# Patient Record
Sex: MALE | Race: OTHER | URBAN - METROPOLITAN AREA
[De-identification: names, ages, dates, MRNs, and addresses within clinical notes are randomized per-mention and may not be internally consistent; named-entity substitution may affect disease eponyms.]

---

## 2023-09-01 ENCOUNTER — APPOINTMENT (OUTPATIENT)
Dept: PRIMARY CARE | Facility: CLINIC | Age: 33
End: 2023-09-01
Payer: COMMERCIAL

## 2023-09-01 ENCOUNTER — CLINICAL SUPPORT (OUTPATIENT)
Dept: PRIMARY CARE | Facility: CLINIC | Age: 33
End: 2023-09-01
Payer: COMMERCIAL

## 2023-09-01 VITALS
TEMPERATURE: 97.8 F | DIASTOLIC BLOOD PRESSURE: 70 MMHG | SYSTOLIC BLOOD PRESSURE: 104 MMHG | BODY MASS INDEX: 33.33 KG/M2 | WEIGHT: 216 LBS

## 2023-09-01 DIAGNOSIS — Z71.85 IMMUNIZATION COUNSELING: Primary | ICD-10-CM

## 2023-09-01 PROCEDURE — 86706 HEP B SURFACE ANTIBODY: CPT

## 2023-09-01 ASSESSMENT — ENCOUNTER SYMPTOMS: DEPRESSION: 0

## 2023-09-01 ASSESSMENT — PATIENT HEALTH QUESTIONNAIRE - PHQ9
2. FEELING DOWN, DEPRESSED OR HOPELESS: NOT AT ALL
1. LITTLE INTEREST OR PLEASURE IN DOING THINGS: NOT AT ALL
SUM OF ALL RESPONSES TO PHQ9 QUESTIONS 1 AND 2: 0

## 2023-09-01 NOTE — PROGRESS NOTES
Subjective   Patient ID: [id number]     Bradley Mendoza is a 32 y.o. male who presents for labs  HPI  His school requires proof of hepatitis B vaccination status.  He has had the Hep B vaccine schedule.  Here for lab titers only.    Objective     There were no vitals taken for this visit.     Physical Exam    Assessment/Plan   Problem List Items Addressed This Visit    None  Visit Diagnoses       Immunization counseling    -  Primary    Relevant Orders    Hepatitis B surface antibody            Moe Araujo DO

## 2023-09-01 NOTE — PROGRESS NOTES
Patient is here for a blood draw to check Hep B titers.  Blood was drawn from the right arm.  Patient tolerated blood draw well.

## 2023-09-02 LAB — HEPATITIS B VIRUS SURFACE AB (MIU/ML) IN SERUM: 210 MIU/ML

## 2023-09-05 ENCOUNTER — TELEPHONE (OUTPATIENT)
Dept: PRIMARY CARE | Facility: CLINIC | Age: 33
End: 2023-09-05
Payer: COMMERCIAL

## 2023-10-09 PROBLEM — K75.81 STEATOHEPATITIS, NONALCOHOLIC: Status: ACTIVE | Noted: 2023-10-09

## 2023-10-09 PROBLEM — M25.562 LEFT KNEE PAIN: Status: ACTIVE | Noted: 2023-10-09

## 2023-10-09 PROBLEM — R05.9 COUGH: Status: ACTIVE | Noted: 2023-10-09

## 2023-10-09 PROBLEM — F41.9 ANXIETY: Status: ACTIVE | Noted: 2023-10-09

## 2023-10-09 PROBLEM — R79.89 ELEVATED LFTS: Status: ACTIVE | Noted: 2023-10-09

## 2023-10-09 PROBLEM — E78.5 HYPERLIPIDEMIA: Status: ACTIVE | Noted: 2023-10-09

## 2023-10-09 PROBLEM — M20.029 BOUTONNIERE DEFORMITY OF FINGER: Status: ACTIVE | Noted: 2023-10-09

## 2023-10-09 PROBLEM — R74.8 ELEVATED LIVER ENZYMES: Status: ACTIVE | Noted: 2023-10-09

## 2023-10-09 PROBLEM — R14.2 BELCHING: Status: ACTIVE | Noted: 2023-10-09

## 2023-10-09 PROBLEM — K76.0 FATTY LIVER: Status: ACTIVE | Noted: 2023-10-09

## 2023-10-09 PROBLEM — M25.50 ARTHRALGIA: Status: ACTIVE | Noted: 2023-10-09

## 2023-10-23 ENCOUNTER — OFFICE VISIT (OUTPATIENT)
Dept: PRIMARY CARE | Facility: CLINIC | Age: 33
End: 2023-10-23
Payer: COMMERCIAL

## 2023-10-23 VITALS
WEIGHT: 214 LBS | TEMPERATURE: 97.8 F | HEIGHT: 68 IN | DIASTOLIC BLOOD PRESSURE: 80 MMHG | BODY MASS INDEX: 32.43 KG/M2 | SYSTOLIC BLOOD PRESSURE: 118 MMHG

## 2023-10-23 DIAGNOSIS — Z00.00 GENERAL MEDICAL EXAM: Primary | ICD-10-CM

## 2023-10-23 LAB
ALBUMIN SERPL BCP-MCNC: 4.8 G/DL (ref 3.4–5)
ALP SERPL-CCNC: 37 U/L (ref 33–120)
ALT SERPL W P-5'-P-CCNC: 57 U/L (ref 10–52)
ANION GAP SERPL CALC-SCNC: 14 MMOL/L (ref 10–20)
APPEARANCE UR: ABNORMAL
AST SERPL W P-5'-P-CCNC: 23 U/L (ref 9–39)
BASOPHILS # BLD AUTO: 0.05 X10*3/UL (ref 0–0.1)
BASOPHILS NFR BLD AUTO: 0.7 %
BILIRUB SERPL-MCNC: 0.7 MG/DL (ref 0–1.2)
BILIRUB UR STRIP.AUTO-MCNC: NEGATIVE MG/DL
BUN SERPL-MCNC: 13 MG/DL (ref 6–23)
CALCIUM SERPL-MCNC: 9.4 MG/DL (ref 8.6–10.3)
CHLORIDE SERPL-SCNC: 103 MMOL/L (ref 98–107)
CHOLEST SERPL-MCNC: 183 MG/DL (ref 0–199)
CHOLESTEROL/HDL RATIO: 4.9
CO2 SERPL-SCNC: 24 MMOL/L (ref 21–32)
COLOR UR: ABNORMAL
CREAT SERPL-MCNC: 0.93 MG/DL (ref 0.5–1.3)
EOSINOPHIL # BLD AUTO: 0.25 X10*3/UL (ref 0–0.7)
EOSINOPHIL NFR BLD AUTO: 3.3 %
ERYTHROCYTE [DISTWIDTH] IN BLOOD BY AUTOMATED COUNT: 12.8 % (ref 11.5–14.5)
GFR SERPL CREATININE-BSD FRML MDRD: >90 ML/MIN/1.73M*2
GLUCOSE SERPL-MCNC: 72 MG/DL (ref 74–99)
GLUCOSE UR STRIP.AUTO-MCNC: NEGATIVE MG/DL
HCT VFR BLD AUTO: 45.4 % (ref 41–52)
HDLC SERPL-MCNC: 37.1 MG/DL
HGB BLD-MCNC: 15.4 G/DL (ref 13.5–17.5)
IMM GRANULOCYTES # BLD AUTO: 0.02 X10*3/UL (ref 0–0.7)
IMM GRANULOCYTES NFR BLD AUTO: 0.3 % (ref 0–0.9)
KETONES UR STRIP.AUTO-MCNC: NEGATIVE MG/DL
LDLC SERPL CALC-MCNC: 117 MG/DL
LEUKOCYTE ESTERASE UR QL STRIP.AUTO: NEGATIVE
LYMPHOCYTES # BLD AUTO: 1.27 X10*3/UL (ref 1.2–4.8)
LYMPHOCYTES NFR BLD AUTO: 16.6 %
MCH RBC QN AUTO: 28.7 PG (ref 26–34)
MCHC RBC AUTO-ENTMCNC: 33.9 G/DL (ref 32–36)
MCV RBC AUTO: 85 FL (ref 80–100)
MONOCYTES # BLD AUTO: 0.56 X10*3/UL (ref 0.1–1)
MONOCYTES NFR BLD AUTO: 7.3 %
NEUTROPHILS # BLD AUTO: 5.51 X10*3/UL (ref 1.2–7.7)
NEUTROPHILS NFR BLD AUTO: 71.8 %
NITRITE UR QL STRIP.AUTO: NEGATIVE
NON HDL CHOLESTEROL: 146 MG/DL (ref 0–149)
NRBC BLD-RTO: 0 /100 WBCS (ref 0–0)
PH UR STRIP.AUTO: 5 [PH]
PLATELET # BLD AUTO: 337 X10*3/UL (ref 150–450)
PMV BLD AUTO: 9.4 FL (ref 7.5–11.5)
POTASSIUM SERPL-SCNC: 4.2 MMOL/L (ref 3.5–5.3)
PROT SERPL-MCNC: 7.5 G/DL (ref 6.4–8.2)
PROT UR STRIP.AUTO-MCNC: NEGATIVE MG/DL
RBC # BLD AUTO: 5.36 X10*6/UL (ref 4.5–5.9)
RBC # UR STRIP.AUTO: NEGATIVE /UL
SODIUM SERPL-SCNC: 137 MMOL/L (ref 136–145)
SP GR UR STRIP.AUTO: 1.02
TRIGL SERPL-MCNC: 146 MG/DL (ref 0–149)
TSH SERPL-ACNC: 1.54 MIU/L (ref 0.44–3.98)
UROBILINOGEN UR STRIP.AUTO-MCNC: <2 MG/DL
VLDL: 29 MG/DL (ref 0–40)
WBC # BLD AUTO: 7.7 X10*3/UL (ref 4.4–11.3)

## 2023-10-23 PROCEDURE — 84443 ASSAY THYROID STIM HORMONE: CPT

## 2023-10-23 PROCEDURE — 83036 HEMOGLOBIN GLYCOSYLATED A1C: CPT

## 2023-10-23 PROCEDURE — 81003 URINALYSIS AUTO W/O SCOPE: CPT

## 2023-10-23 PROCEDURE — 99395 PREV VISIT EST AGE 18-39: CPT | Performed by: FAMILY MEDICINE

## 2023-10-23 PROCEDURE — 85025 COMPLETE CBC W/AUTO DIFF WBC: CPT

## 2023-10-23 PROCEDURE — 1036F TOBACCO NON-USER: CPT | Performed by: FAMILY MEDICINE

## 2023-10-23 PROCEDURE — 80053 COMPREHEN METABOLIC PANEL: CPT

## 2023-10-23 PROCEDURE — 80061 LIPID PANEL: CPT

## 2023-10-23 PROCEDURE — 36415 COLL VENOUS BLD VENIPUNCTURE: CPT

## 2023-10-23 ASSESSMENT — PROMIS GLOBAL HEALTH SCALE
RATE_PHYSICAL_HEALTH: GOOD
RATE_AVERAGE_PAIN: 0
RATE_MENTAL_HEALTH: GOOD
RATE_AVERAGE_FATIGUE: MILD
EMOTIONAL_PROBLEMS: OFTEN
RATE_QUALITY_OF_LIFE: GOOD
CARRYOUT_PHYSICAL_ACTIVITIES: COMPLETELY
RATE_SOCIAL_SATISFACTION: GOOD
CARRYOUT_SOCIAL_ACTIVITIES: GOOD
RATE_GENERAL_HEALTH: GOOD

## 2023-10-23 ASSESSMENT — ENCOUNTER SYMPTOMS
PALPITATIONS: 0
WEAKNESS: 0
NUMBNESS: 0
SHORTNESS OF BREATH: 0
COUGH: 0
SLEEP DISTURBANCE: 0
DIARRHEA: 0
SORE THROAT: 1
VOICE CHANGE: 0
ROS SKIN COMMENTS: NO MOLES GROWING OR CHANGING.
CONSTIPATION: 0
NAUSEA: 0
VOMITING: 0
WOUND: 0
MYALGIAS: 0
BACK PAIN: 0
WHEEZING: 0
BLOOD IN STOOL: 1
DYSPHORIC MOOD: 0
DYSURIA: 0
DEPRESSION: 0
RHINORRHEA: 1
HEADACHES: 0
FATIGUE: 0
SINUS PAIN: 0
NERVOUS/ANXIOUS: 1
TROUBLE SWALLOWING: 0
FEVER: 0
ARTHRALGIAS: 0
ADENOPATHY: 0
DIZZINESS: 1
ABDOMINAL PAIN: 0
HEMATURIA: 0

## 2023-10-23 ASSESSMENT — PATIENT HEALTH QUESTIONNAIRE - PHQ9
1. LITTLE INTEREST OR PLEASURE IN DOING THINGS: NOT AT ALL
2. FEELING DOWN, DEPRESSED OR HOPELESS: NOT AT ALL
SUM OF ALL RESPONSES TO PHQ9 QUESTIONS 1 AND 2: 0

## 2023-10-23 NOTE — PATIENT INSTRUCTIONS
I will order labs.  Try to restrict sugars and starches.  Try to increase exercise.  Return in one year for a general medical exam.  Return if the rectal bleeding comes back.  You had an episode a few months ago that has not returned.

## 2023-10-23 NOTE — PROGRESS NOTES
"Subjective   Patient ID: [id number]     Bradley Mendoza is a 32 y.o. male who presents for Annual Exam (Fasting).  HPI  He is here for a general medical exam.      He feels well in general.  /80.      No changes in medical hx.  No new surgeries or hospital stays.    No medications.    Never a smoker.  Drinks occasionally.  Occasional marijuana.  No other drugs.    FamHx.  Dad and both paternal grandparents DM.  Mom has bipolar and depression.  Sibs healthy.    Review of Systems   Constitutional:  Negative for fatigue and fever.   HENT:  Positive for rhinorrhea and sore throat. Negative for congestion, sinus pain, trouble swallowing and voice change.    Respiratory:  Negative for cough, shortness of breath and wheezing.    Cardiovascular:  Negative for chest pain, palpitations and leg swelling.   Gastrointestinal:  Positive for blood in stool (had blood in stool two months ago.  Thinks it was a hemorrhoid.  None since.). Negative for abdominal pain, constipation, diarrhea, nausea and vomiting.   Genitourinary:  Negative for dysuria, hematuria, scrotal swelling and testicular pain.   Musculoskeletal:  Negative for arthralgias, back pain and myalgias.   Skin:  Negative for rash and wound.        No moles growing or changing.   Neurological:  Positive for dizziness (occasional dizziness he relates to worsened astigmatism.). Negative for syncope, weakness, numbness and headaches.   Hematological:  Negative for adenopathy.   Psychiatric/Behavioral:  Negative for dysphoric mood, self-injury, sleep disturbance and suicidal ideas. The patient is nervous/anxious.         Had depression symptoms last year.  Better now.  More anxiety now.  No depression.           Objective     /80 (BP Location: Left arm, Patient Position: Sitting)   Temp 36.6 °C (97.8 °F) (Skin)   Ht 1.727 m (5' 8\")   Wt 97.1 kg (214 lb)   BMI 32.54 kg/m²      Physical Exam  Vitals reviewed.   Constitutional:       General: He is not in acute " distress.     Appearance: Normal appearance. He is not ill-appearing or toxic-appearing.   HENT:      Head: Normocephalic and atraumatic.      Right Ear: Tympanic membrane, ear canal and external ear normal.      Left Ear: Tympanic membrane, ear canal and external ear normal.      Nose: Nose normal.      Mouth/Throat:      Mouth: Mucous membranes are moist.   Eyes:      Extraocular Movements: Extraocular movements intact.      Conjunctiva/sclera: Conjunctivae normal.      Pupils: Pupils are equal, round, and reactive to light.   Cardiovascular:      Rate and Rhythm: Normal rate and regular rhythm.      Heart sounds: No murmur heard.  Pulmonary:      Effort: Pulmonary effort is normal.      Breath sounds: Normal breath sounds.   Abdominal:      General: Bowel sounds are normal. There is no distension.      Palpations: Abdomen is soft. There is no mass.      Tenderness: There is no abdominal tenderness. There is no guarding or rebound.   Genitourinary:     Penis: Normal.       Testes: Normal.   Musculoskeletal:         General: No tenderness.      Cervical back: Neck supple.      Right lower leg: No edema.      Left lower leg: No edema.   Skin:     Coloration: Skin is not jaundiced or pale.      Findings: No rash.   Neurological:      General: No focal deficit present.      Mental Status: He is alert and oriented to person, place, and time. Mental status is at baseline.   Psychiatric:         Mood and Affect: Mood normal.         Behavior: Behavior normal.         Thought Content: Thought content normal.         Judgment: Judgment normal.         Assessment/Plan   Problem List Items Addressed This Visit    None  Visit Diagnoses       General medical exam    -  Primary    Relevant Orders    CBC and Auto Differential    Hemoglobin A1C    Comprehensive Metabolic Panel    Lipid Panel    Thyroid Stimulating Hormone    Urinalysis with Reflex Microscopic        I will order labs.  Try to restrict sugars and starches.  Try to  increase exercise.  Return in one year for a general medical exam.  Return if the rectal bleeding comes back.  You had an episode a few months ago that has not returned.      Moe Araujo, DO

## 2023-10-24 LAB
EST. AVERAGE GLUCOSE BLD GHB EST-MCNC: 97 MG/DL
HBA1C MFR BLD: 5 %

## 2023-11-05 DIAGNOSIS — Z00.00 GENERAL MEDICAL EXAM: Primary | ICD-10-CM

## 2023-11-10 ENCOUNTER — LAB (OUTPATIENT)
Dept: LAB | Facility: LAB | Age: 33
End: 2023-11-10
Payer: COMMERCIAL

## 2023-11-10 DIAGNOSIS — Z00.00 GENERAL MEDICAL EXAM: ICD-10-CM

## 2023-11-10 PROCEDURE — 86803 HEPATITIS C AB TEST: CPT

## 2023-11-10 PROCEDURE — 86780 TREPONEMA PALLIDUM: CPT

## 2023-11-10 PROCEDURE — 86695 HERPES SIMPLEX TYPE 1 TEST: CPT

## 2023-11-10 PROCEDURE — 87800 DETECT AGNT MULT DNA DIREC: CPT

## 2023-11-10 PROCEDURE — 86694 HERPES SIMPLEX NES ANTBDY: CPT

## 2023-11-10 PROCEDURE — 87340 HEPATITIS B SURFACE AG IA: CPT

## 2023-11-10 PROCEDURE — 87389 HIV-1 AG W/HIV-1&-2 AB AG IA: CPT

## 2023-11-10 PROCEDURE — 86696 HERPES SIMPLEX TYPE 2 TEST: CPT

## 2023-11-10 PROCEDURE — 36415 COLL VENOUS BLD VENIPUNCTURE: CPT

## 2023-11-11 LAB
C TRACH RRNA SPEC QL NAA+PROBE: NEGATIVE
HBV SURFACE AG SERPL QL IA: NONREACTIVE
HCV AB SER QL: NONREACTIVE
HERPES SIMPLEX VIRUS 1 IGG: >8 INDEX
HERPES SIMPLEX VIRUS 2 IGG: <0.2 INDEX
HIV 1+2 AB+HIV1 P24 AG SERPL QL IA: NONREACTIVE
N GONORRHOEA DNA SPEC QL PROBE+SIG AMP: NEGATIVE
T PALLIDUM AB SER QL: NONREACTIVE

## 2023-11-13 LAB — HSV1+2 IGM SER IA-ACNC: 0.8 IV

## 2024-01-09 ENCOUNTER — OFFICE VISIT (OUTPATIENT)
Dept: PRIMARY CARE | Facility: CLINIC | Age: 34
End: 2024-01-09
Payer: COMMERCIAL

## 2024-01-09 VITALS
WEIGHT: 215 LBS | BODY MASS INDEX: 32.69 KG/M2 | DIASTOLIC BLOOD PRESSURE: 72 MMHG | TEMPERATURE: 97.9 F | SYSTOLIC BLOOD PRESSURE: 122 MMHG

## 2024-01-09 DIAGNOSIS — K64.4 ANAL SKIN TAG: Primary | ICD-10-CM

## 2024-01-09 PROCEDURE — 99213 OFFICE O/P EST LOW 20 MIN: CPT | Performed by: FAMILY MEDICINE

## 2024-01-09 PROCEDURE — 1036F TOBACCO NON-USER: CPT | Performed by: FAMILY MEDICINE

## 2024-01-09 ASSESSMENT — ENCOUNTER SYMPTOMS: DEPRESSION: 0

## 2024-01-09 NOTE — PATIENT INSTRUCTIONS
You have either an external hemorrhoid or an anal skin tag.  This gets swollen and bothersome and you would like it removed.  I will refer you to a colorectal surgeon for removal.

## 2024-01-09 NOTE — PROGRESS NOTES
"Subjective   Patient ID: [id number]     Bradley Mendoza \"Holly\" is a 33 y.o. male who presents for Skin Tag (Requesting referral for surgical removal.).  HPI  He has a skin tag in the rectal area and is requesting a referral for removal of it.      It does not hurt. It bothers him with swelling with constipation.      No abdominal pain.  He has bleeding once every two to three months.      No N/V/D.            Objective     /72 (BP Location: Left arm, Patient Position: Sitting)   Temp 36.6 °C (97.9 °F) (Skin)   Wt 97.5 kg (215 lb)   BMI 32.69 kg/m²      Physical Exam  Constitutional:       General: He is not in acute distress.     Appearance: He is not toxic-appearing.   Genitourinary:     Comments: Anal region, 1 cm protrusion.  Skin tag vs external hemorrhoid. No ulceration, tenderness or edema.    Neurological:      Mental Status: He is alert.         Assessment/Plan   Problem List Items Addressed This Visit    None  Visit Diagnoses       Anal skin tag    -  Primary    Relevant Orders    Referral to Colorectal Surgery        You have either an external hemorrhoid or an anal skin tag.  This gets swollen and bothersome and you would like it removed.  I will refer you to a colorectal surgeon for removal.    Moe Araujo, DO   "

## 2024-01-12 ENCOUNTER — OFFICE VISIT (OUTPATIENT)
Dept: SURGERY | Facility: CLINIC | Age: 34
End: 2024-01-12
Payer: COMMERCIAL

## 2024-01-12 VITALS
DIASTOLIC BLOOD PRESSURE: 80 MMHG | TEMPERATURE: 98.3 F | SYSTOLIC BLOOD PRESSURE: 122 MMHG | WEIGHT: 216 LBS | BODY MASS INDEX: 32.84 KG/M2 | HEART RATE: 69 BPM

## 2024-01-12 DIAGNOSIS — K64.4 ANAL SKIN TAG: ICD-10-CM

## 2024-01-12 PROCEDURE — 99212 OFFICE O/P EST SF 10 MIN: CPT | Performed by: NURSE PRACTITIONER

## 2024-01-12 PROCEDURE — 1036F TOBACCO NON-USER: CPT | Performed by: NURSE PRACTITIONER

## 2024-01-12 PROCEDURE — 99202 OFFICE O/P NEW SF 15 MIN: CPT | Performed by: NURSE PRACTITIONER

## 2024-01-12 PROCEDURE — 46600 DIAGNOSTIC ANOSCOPY SPX: CPT | Performed by: NURSE PRACTITIONER

## 2024-01-12 ASSESSMENT — ENCOUNTER SYMPTOMS
CARDIOVASCULAR NEGATIVE: 1
ENDOCRINE NEGATIVE: 1
RESPIRATORY NEGATIVE: 1
ROS GI COMMENTS: AS NOTED IN HPI
NERVOUS/ANXIOUS: 1
CONSTITUTIONAL NEGATIVE: 1
RECTAL BLEEDING: 1

## 2024-01-12 ASSESSMENT — PAIN SCALES - GENERAL: PAINLEVEL: 0-NO PAIN

## 2024-01-12 NOTE — PROGRESS NOTES
"Chief complaint:  Anal skin tag    History of present illness:  Bradley is a 33M with here for anal skin tag.  He states he has an anal skin tag that has gotten larger over the last few years. He saw his PCP and referred her to discuss removal. Rare bleeding. Tag is tender but not painful. Intermittent swelling causing increased tenderness. Sometimes difficult to clean. He has BM 1-2 times daily. Occasional hard stool and straining but usually soft stools. Denies any anal incontinence.  + Anal sex. He has not been having anal sex the past few months.  Denies any STD history.    PMH: gastritis (watches diet),   PSH: Frenectomy  Family history: DM & HTN paternal side. Paternal grand father with prostate cancer  Tobacco use: denies  Alcohol use: 2 times months  Recreation drugs: marijuana once monthly    Colonoscopy: 2013 in [place]    Review of Systems   Constitutional: Negative.    Respiratory: Negative.     Cardiovascular: Negative.    Gastrointestinal:         As noted in HPI   Endocrine: Negative.    Psychiatric/Behavioral:  The patient is nervous/anxious.         Physical Exam  Exam conducted with a chaperone present.   Constitutional:       Appearance: Normal appearance.   HENT:      Head: Normocephalic and atraumatic.   Cardiovascular:      Rate and Rhythm: Normal rate and regular rhythm.      Pulses: Normal pulses.      Heart sounds: Normal heart sounds.   Pulmonary:      Effort: Pulmonary effort is normal.      Breath sounds: Normal breath sounds.   Abdominal:      Palpations: Abdomen is soft.   Genitourinary:     Comments: 1 cm anal skin tag. Soft.  Musculoskeletal:      Cervical back: Neck supple.   Skin:     General: Skin is warm and dry.   Neurological:      General: No focal deficit present.      Mental Status: He is alert and oriented to person, place, and time.   Psychiatric:         Mood and Affect: Mood normal.         Behavior: Behavior normal.     Patient ID: Bradley Mendoza \"Holly\" is a 33 " y.o. male.    Anoscopy    Date/Time: 1/12/2024 7:37 PM    Performed by: SHELBI Chan  Authorized by: SHELBI Chan    Consent:     Consent obtained:  Verbal    Consent given by:  Patient    Risks, benefits, and alternatives were discussed: yes      Risks discussed:  Bleeding and pain    Alternatives discussed:  No treatment and alternative treatment  Universal protocol:     Procedure explained and questions answered to patient or proxy's satisfaction: yes      Relevant documents present and verified: yes      Test results available: no      Imaging studies available: no      Required blood products, implants, devices, and special equipment available: no      Site/side marked: no      Immediately prior to procedure, a time out was called: no      Patient identity confirmed:  Verbally with patient  Indications:     Indications: rectal bleeding    Procedure details:     Internal hemorrhoids: yes      Inflammation: yes      Anal fissures: no      Anal fistulae: no      Anal stricture: no      Abscess: no      Tearing: no      Blood in rectal vault: no    Post-procedure details:     Procedure completion:  Tolerated well, no immediate complications       A chaperone was present during the exam, Alvina.  /80   Pulse 69   Temp 36.8 °C (98.3 °F)   Wt 98 kg (216 lb)   BMI 32.84 kg/m²     Assessment:  33M tender anal skin tag  Intermittent hard stools and straining    Plan:   -Increase fluid intake: aim for 8-10, 8 oz glasses of water daily. Other beverages, such as juice, coffee, or tea may count toward this goal.  -Increase high fiber foods: fruits , vegetables, whole grains, and beans. Aim for 25-35 grams per day.  -Add a powder fiber supplement daily. Like Metamucil, Citrucel, Konsyl or Benefiber daily-follow instructions on the bottle.  -Avoiding sitting on the toilet for prolonged periods of time  -Avoid straining  -Follow-up with Dr. Lawson for possible skin tag excision

## 2024-01-13 NOTE — PATIENT INSTRUCTIONS
-Increase fluid intake: aim for 8-10, 8 oz glasses of water daily. Other beverages, such as juice, coffee, or tea may count toward this goal.  -Increase high fiber foods: fruits , vegetables, whole grains, and beans. Aim for 25-35 grams per day.  -Add a powder fiber supplement daily. Like Metamucil, Citrucel, Konsyl or Benefiber daily-follow instructions on the bottle.  -Avoiding sitting on the toilet for prolonged periods of time  -Avoid straining  -Follow-up with Dr. Lawson for possible skin tag excision

## 2024-01-23 DIAGNOSIS — K64.4 ANAL SKIN TAG: ICD-10-CM

## 2024-01-31 PROBLEM — K64.4 ANAL SKIN TAG: Status: ACTIVE | Noted: 2024-01-23

## 2024-02-29 ENCOUNTER — PREP FOR PROCEDURE (OUTPATIENT)
Dept: SURGERY | Facility: HOSPITAL | Age: 34
End: 2024-02-29
Payer: COMMERCIAL

## 2024-02-29 RX ORDER — SODIUM CHLORIDE, SODIUM LACTATE, POTASSIUM CHLORIDE, CALCIUM CHLORIDE 600; 310; 30; 20 MG/100ML; MG/100ML; MG/100ML; MG/100ML
100 INJECTION, SOLUTION INTRAVENOUS CONTINUOUS
Status: CANCELLED | OUTPATIENT
Start: 2024-03-11

## 2024-02-29 RX ORDER — CEFAZOLIN SODIUM 2 G/100ML
2 INJECTION, SOLUTION INTRAVENOUS ONCE
Status: CANCELLED | OUTPATIENT
Start: 2024-03-11 | End: 2024-02-29

## 2024-02-29 RX ORDER — METRONIDAZOLE 500 MG/100ML
500 INJECTION, SOLUTION INTRAVENOUS ONCE
Status: CANCELLED | OUTPATIENT
Start: 2024-03-11 | End: 2024-02-29

## 2024-03-10 ENCOUNTER — PREP FOR PROCEDURE (OUTPATIENT)
Dept: SURGERY | Facility: HOSPITAL | Age: 34
End: 2024-03-10
Payer: COMMERCIAL

## 2024-03-11 ENCOUNTER — ANESTHESIA (OUTPATIENT)
Dept: OPERATING ROOM | Facility: HOSPITAL | Age: 34
End: 2024-03-11
Payer: COMMERCIAL

## 2024-03-11 ENCOUNTER — ANESTHESIA EVENT (OUTPATIENT)
Dept: OPERATING ROOM | Facility: HOSPITAL | Age: 34
End: 2024-03-11
Payer: COMMERCIAL

## 2024-03-11 ENCOUNTER — HOSPITAL ENCOUNTER (OUTPATIENT)
Facility: HOSPITAL | Age: 34
Setting detail: OUTPATIENT SURGERY
Discharge: HOME | End: 2024-03-11
Attending: STUDENT IN AN ORGANIZED HEALTH CARE EDUCATION/TRAINING PROGRAM | Admitting: STUDENT IN AN ORGANIZED HEALTH CARE EDUCATION/TRAINING PROGRAM
Payer: COMMERCIAL

## 2024-03-11 VITALS
SYSTOLIC BLOOD PRESSURE: 132 MMHG | TEMPERATURE: 97.3 F | WEIGHT: 215 LBS | BODY MASS INDEX: 32.58 KG/M2 | HEART RATE: 80 BPM | RESPIRATION RATE: 18 BRPM | OXYGEN SATURATION: 98 % | HEIGHT: 68 IN | DIASTOLIC BLOOD PRESSURE: 78 MMHG

## 2024-03-11 DIAGNOSIS — K64.4 ANAL SKIN TAG: ICD-10-CM

## 2024-03-11 PROCEDURE — 7100000002 HC RECOVERY ROOM TIME - EACH INCREMENTAL 1 MINUTE: Performed by: STUDENT IN AN ORGANIZED HEALTH CARE EDUCATION/TRAINING PROGRAM

## 2024-03-11 PROCEDURE — 2500000005 HC RX 250 GENERAL PHARMACY W/O HCPCS: Performed by: STUDENT IN AN ORGANIZED HEALTH CARE EDUCATION/TRAINING PROGRAM

## 2024-03-11 PROCEDURE — 3600000007 HC OR TIME - EACH INCREMENTAL 1 MINUTE - PROCEDURE LEVEL TWO: Performed by: STUDENT IN AN ORGANIZED HEALTH CARE EDUCATION/TRAINING PROGRAM

## 2024-03-11 PROCEDURE — 3700000002 HC GENERAL ANESTHESIA TIME - EACH INCREMENTAL 1 MINUTE: Performed by: STUDENT IN AN ORGANIZED HEALTH CARE EDUCATION/TRAINING PROGRAM

## 2024-03-11 PROCEDURE — 88304 TISSUE EXAM BY PATHOLOGIST: CPT | Mod: TC,STJLAB | Performed by: STUDENT IN AN ORGANIZED HEALTH CARE EDUCATION/TRAINING PROGRAM

## 2024-03-11 PROCEDURE — A46220 PR EXCISION SINGLE EXTERNAL PAPILLA OR TAG ANUS: Performed by: ANESTHESIOLOGIST ASSISTANT

## 2024-03-11 PROCEDURE — 46220 EXCISE ANAL EXT TAG/PAPILLA: CPT | Performed by: STUDENT IN AN ORGANIZED HEALTH CARE EDUCATION/TRAINING PROGRAM

## 2024-03-11 PROCEDURE — 3700000001 HC GENERAL ANESTHESIA TIME - INITIAL BASE CHARGE: Performed by: STUDENT IN AN ORGANIZED HEALTH CARE EDUCATION/TRAINING PROGRAM

## 2024-03-11 PROCEDURE — 2500000004 HC RX 250 GENERAL PHARMACY W/ HCPCS (ALT 636 FOR OP/ED): Performed by: ANESTHESIOLOGIST ASSISTANT

## 2024-03-11 PROCEDURE — 3600000002 HC OR TIME - INITIAL BASE CHARGE - PROCEDURE LEVEL TWO: Performed by: STUDENT IN AN ORGANIZED HEALTH CARE EDUCATION/TRAINING PROGRAM

## 2024-03-11 PROCEDURE — 7100000009 HC PHASE TWO TIME - INITIAL BASE CHARGE: Performed by: STUDENT IN AN ORGANIZED HEALTH CARE EDUCATION/TRAINING PROGRAM

## 2024-03-11 PROCEDURE — 2500000001 HC RX 250 WO HCPCS SELF ADMINISTERED DRUGS (ALT 637 FOR MEDICARE OP): Performed by: ANESTHESIOLOGY

## 2024-03-11 PROCEDURE — 7100000010 HC PHASE TWO TIME - EACH INCREMENTAL 1 MINUTE: Performed by: STUDENT IN AN ORGANIZED HEALTH CARE EDUCATION/TRAINING PROGRAM

## 2024-03-11 PROCEDURE — 2500000004 HC RX 250 GENERAL PHARMACY W/ HCPCS (ALT 636 FOR OP/ED): Performed by: STUDENT IN AN ORGANIZED HEALTH CARE EDUCATION/TRAINING PROGRAM

## 2024-03-11 PROCEDURE — 88304 TISSUE EXAM BY PATHOLOGIST: CPT | Performed by: STUDENT IN AN ORGANIZED HEALTH CARE EDUCATION/TRAINING PROGRAM

## 2024-03-11 PROCEDURE — 7100000001 HC RECOVERY ROOM TIME - INITIAL BASE CHARGE: Performed by: STUDENT IN AN ORGANIZED HEALTH CARE EDUCATION/TRAINING PROGRAM

## 2024-03-11 PROCEDURE — 2500000005 HC RX 250 GENERAL PHARMACY W/O HCPCS: Performed by: ANESTHESIOLOGIST ASSISTANT

## 2024-03-11 PROCEDURE — A46220 PR EXCISION SINGLE EXTERNAL PAPILLA OR TAG ANUS: Performed by: ANESTHESIOLOGY

## 2024-03-11 RX ORDER — DIPHENHYDRAMINE HYDROCHLORIDE 50 MG/ML
12.5 INJECTION INTRAMUSCULAR; INTRAVENOUS ONCE AS NEEDED
Status: DISCONTINUED | OUTPATIENT
Start: 2024-03-11 | End: 2024-03-11 | Stop reason: HOSPADM

## 2024-03-11 RX ORDER — LABETALOL HYDROCHLORIDE 5 MG/ML
5 INJECTION, SOLUTION INTRAVENOUS
Status: DISCONTINUED | OUTPATIENT
Start: 2024-03-11 | End: 2024-03-11 | Stop reason: HOSPADM

## 2024-03-11 RX ORDER — ONDANSETRON HYDROCHLORIDE 2 MG/ML
INJECTION, SOLUTION INTRAVENOUS AS NEEDED
Status: DISCONTINUED | OUTPATIENT
Start: 2024-03-11 | End: 2024-03-11

## 2024-03-11 RX ORDER — MIDAZOLAM HYDROCHLORIDE 1 MG/ML
1 INJECTION, SOLUTION INTRAMUSCULAR; INTRAVENOUS ONCE AS NEEDED
Status: DISCONTINUED | OUTPATIENT
Start: 2024-03-11 | End: 2024-03-11 | Stop reason: HOSPADM

## 2024-03-11 RX ORDER — MIDAZOLAM HYDROCHLORIDE 1 MG/ML
INJECTION, SOLUTION INTRAMUSCULAR; INTRAVENOUS AS NEEDED
Status: DISCONTINUED | OUTPATIENT
Start: 2024-03-11 | End: 2024-03-11

## 2024-03-11 RX ORDER — FENTANYL CITRATE 50 UG/ML
INJECTION, SOLUTION INTRAMUSCULAR; INTRAVENOUS AS NEEDED
Status: DISCONTINUED | OUTPATIENT
Start: 2024-03-11 | End: 2024-03-11

## 2024-03-11 RX ORDER — DEXAMETHASONE SODIUM PHOSPHATE 10 MG/ML
INJECTION INTRAMUSCULAR; INTRAVENOUS AS NEEDED
Status: DISCONTINUED | OUTPATIENT
Start: 2024-03-11 | End: 2024-03-11

## 2024-03-11 RX ORDER — SODIUM CHLORIDE, SODIUM LACTATE, POTASSIUM CHLORIDE, CALCIUM CHLORIDE 600; 310; 30; 20 MG/100ML; MG/100ML; MG/100ML; MG/100ML
100 INJECTION, SOLUTION INTRAVENOUS CONTINUOUS
Status: DISCONTINUED | OUTPATIENT
Start: 2024-03-11 | End: 2024-03-11 | Stop reason: HOSPADM

## 2024-03-11 RX ORDER — PROPOFOL 10 MG/ML
INJECTION, EMULSION INTRAVENOUS AS NEEDED
Status: DISCONTINUED | OUTPATIENT
Start: 2024-03-11 | End: 2024-03-11

## 2024-03-11 RX ORDER — ALBUTEROL SULFATE 0.83 MG/ML
2.5 SOLUTION RESPIRATORY (INHALATION)
Status: DISCONTINUED | OUTPATIENT
Start: 2024-03-11 | End: 2024-03-11 | Stop reason: HOSPADM

## 2024-03-11 RX ORDER — METOCLOPRAMIDE HYDROCHLORIDE 5 MG/ML
10 INJECTION INTRAMUSCULAR; INTRAVENOUS ONCE AS NEEDED
Status: DISCONTINUED | OUTPATIENT
Start: 2024-03-11 | End: 2024-03-11 | Stop reason: HOSPADM

## 2024-03-11 RX ORDER — METRONIDAZOLE 500 MG/100ML
500 INJECTION, SOLUTION INTRAVENOUS ONCE
Status: COMPLETED | OUTPATIENT
Start: 2024-03-11 | End: 2024-03-11

## 2024-03-11 RX ORDER — LIDOCAINE HYDROCHLORIDE 20 MG/ML
INJECTION, SOLUTION EPIDURAL; INFILTRATION; INTRACAUDAL; PERINEURAL AS NEEDED
Status: DISCONTINUED | OUTPATIENT
Start: 2024-03-11 | End: 2024-03-11

## 2024-03-11 RX ORDER — LIDOCAINE HYDROCHLORIDE AND EPINEPHRINE 10; 10 MG/ML; UG/ML
INJECTION, SOLUTION INFILTRATION; PERINEURAL AS NEEDED
Status: DISCONTINUED | OUTPATIENT
Start: 2024-03-11 | End: 2024-03-11 | Stop reason: HOSPADM

## 2024-03-11 RX ORDER — CEFAZOLIN SODIUM 2 G/100ML
2 INJECTION, SOLUTION INTRAVENOUS ONCE
Status: COMPLETED | OUTPATIENT
Start: 2024-03-11 | End: 2024-03-11

## 2024-03-11 RX ORDER — HYDROMORPHONE HYDROCHLORIDE 1 MG/ML
1 INJECTION, SOLUTION INTRAMUSCULAR; INTRAVENOUS; SUBCUTANEOUS EVERY 5 MIN PRN
Status: DISCONTINUED | OUTPATIENT
Start: 2024-03-11 | End: 2024-03-11 | Stop reason: HOSPADM

## 2024-03-11 RX ORDER — HYDROCODONE BITARTRATE AND ACETAMINOPHEN 5; 325 MG/1; MG/1
1 TABLET ORAL EVERY 4 HOURS PRN
Status: DISCONTINUED | OUTPATIENT
Start: 2024-03-11 | End: 2024-03-11 | Stop reason: HOSPADM

## 2024-03-11 RX ORDER — HYDRALAZINE HYDROCHLORIDE 20 MG/ML
5 INJECTION INTRAMUSCULAR; INTRAVENOUS EVERY 30 MIN PRN
Status: DISCONTINUED | OUTPATIENT
Start: 2024-03-11 | End: 2024-03-11 | Stop reason: HOSPADM

## 2024-03-11 RX ORDER — OXYCODONE AND ACETAMINOPHEN 5; 325 MG/1; MG/1
1 TABLET ORAL EVERY 6 HOURS PRN
Qty: 8 TABLET | Refills: 0 | Status: SHIPPED | OUTPATIENT
Start: 2024-03-11 | End: 2024-03-13

## 2024-03-11 RX ADMIN — LIDOCAINE HYDROCHLORIDE 100 MG: 20 INJECTION, SOLUTION EPIDURAL; INFILTRATION; INTRACAUDAL; PERINEURAL at 07:51

## 2024-03-11 RX ADMIN — HYDROCODONE BITARTRATE AND ACETAMINOPHEN 1 TABLET: 5; 325 TABLET ORAL at 10:28

## 2024-03-11 RX ADMIN — DEXAMETHASONE SODIUM PHOSPHATE 10 MG: 10 INJECTION INTRAMUSCULAR; INTRAVENOUS at 07:55

## 2024-03-11 RX ADMIN — ONDANSETRON 4 MG: 2 INJECTION, SOLUTION INTRAMUSCULAR; INTRAVENOUS at 08:15

## 2024-03-11 RX ADMIN — MIDAZOLAM 2 MG: 1 INJECTION INTRAMUSCULAR; INTRAVENOUS at 07:42

## 2024-03-11 RX ADMIN — METRONIDAZOLE 500 MG: 500 INJECTION, SOLUTION INTRAVENOUS at 07:41

## 2024-03-11 RX ADMIN — FENTANYL CITRATE 50 MCG: 50 INJECTION, SOLUTION INTRAMUSCULAR; INTRAVENOUS at 07:51

## 2024-03-11 RX ADMIN — SODIUM CHLORIDE, POTASSIUM CHLORIDE, SODIUM LACTATE AND CALCIUM CHLORIDE: 600; 310; 30; 20 INJECTION, SOLUTION INTRAVENOUS at 07:40

## 2024-03-11 RX ADMIN — CEFAZOLIN SODIUM 2 G: 2 INJECTION, SOLUTION INTRAVENOUS at 08:00

## 2024-03-11 RX ADMIN — FENTANYL CITRATE 50 MCG: 50 INJECTION, SOLUTION INTRAMUSCULAR; INTRAVENOUS at 08:13

## 2024-03-11 RX ADMIN — PROPOFOL 200 MG: 10 INJECTION, EMULSION INTRAVENOUS at 07:51

## 2024-03-11 ASSESSMENT — ENCOUNTER SYMPTOMS
ACTIVITY CHANGE: 0
FREQUENCY: 0
DIFFICULTY URINATING: 0
BLOOD IN STOOL: 0
COUGH: 0
FATIGUE: 0
UNEXPECTED WEIGHT CHANGE: 0
WHEEZING: 0
DIZZINESS: 0
WEAKNESS: 0
NAUSEA: 0
RECTAL PAIN: 0
LIGHT-HEADEDNESS: 0
HEMATURIA: 0
PALPITATIONS: 0
CHEST TIGHTNESS: 0
CONSTIPATION: 0
CHOKING: 0
AGITATION: 0
DIARRHEA: 0
APPETITE CHANGE: 0
FEVER: 0
ANAL BLEEDING: 0
CHILLS: 0
VOMITING: 0
ABDOMINAL PAIN: 0
ABDOMINAL DISTENTION: 0
DYSURIA: 0
SHORTNESS OF BREATH: 0

## 2024-03-11 ASSESSMENT — PAIN - FUNCTIONAL ASSESSMENT
PAIN_FUNCTIONAL_ASSESSMENT: 0-10

## 2024-03-11 ASSESSMENT — PAIN SCALES - GENERAL
PAINLEVEL_OUTOF10: 0 - NO PAIN
PAINLEVEL_OUTOF10: 4
PAINLEVEL_OUTOF10: 0 - NO PAIN
PAINLEVEL_OUTOF10: 4
PAINLEVEL_OUTOF10: 0 - NO PAIN

## 2024-03-11 ASSESSMENT — COLUMBIA-SUICIDE SEVERITY RATING SCALE - C-SSRS
6. HAVE YOU EVER DONE ANYTHING, STARTED TO DO ANYTHING, OR PREPARED TO DO ANYTHING TO END YOUR LIFE?: NO
1. IN THE PAST MONTH, HAVE YOU WISHED YOU WERE DEAD OR WISHED YOU COULD GO TO SLEEP AND NOT WAKE UP?: NO
2. HAVE YOU ACTUALLY HAD ANY THOUGHTS OF KILLING YOURSELF?: NO

## 2024-03-11 NOTE — OP NOTE
"Anal/Rectal Procedure/EUA Minor Operative Note     Date: 3/11/2024  OR Location: [facility] OR    Name: Bradley Mendoza \"Holly\", : 1990, Age: 33 y.o., MRN: [id number], Sex: male    Diagnosis  Pre-op Diagnosis     * Anal skin tag [K64.4] Post-op Diagnosis     * Anal skin tag [K64.4]     Procedures   Excision of anal tag    Surgeons      * Qasim Lawson - Primary    Resident/Fellow/Other Assistant:  Surgeon(s) and Role:    Procedure Summary  Anesthesia: * No anesthesia type entered *  ASA: II  Anesthesia Staff: Anesthesiologist: Nathaniel Artis MD  C-AA: CHASE Golden  Estimated Blood Loss: 3mL  Intra-op Medications:   Administrations occurring from 0730 to 0910 on 24:   Medication Name Total Dose   lidocaine-epinephrine (Xylocaine W/EPI) 1 %-1:100,000 injection 20 mL   ceFAZolin in dextrose (iso-os) (Ancef) IVPB 2 g 2 g   metroNIDAZOLE (Flagyl) 500 mg in NaCl (iso-os) 100 mL 500 mg   lactated Ringer's infusion Cannot be calculated              Anesthesia Record               Intraprocedure I/O Totals          Intake    lactated Ringer's infusion 500.00 mL    Total Intake 500 mL       Output    Est. Blood Loss 3 mL    Total Output 3 mL       Net    Net Volume 497 mL          Specimen:   ID Type Source Tests Collected by Time   1 : anal tag Tissue ANUS TAG SURGICAL PATHOLOGY EXAM Qasim Lawson MD 3/11/2024 0815        Staff:   Circulator: Fletcher Carlos RN  Scrub Person: Libra Thomas; Qasim Spear         Drains and/or Catheters: * None in log *    Tourniquet Times:         Implants:     Findings: Posterior midline anal tag.    Indications: Holly Mendoza is an 33 y.o. male who is having surgery for Anal skin tag [K64.4].     The patient was seen in the preoperative area. The risks, benefits, complications, treatment options, non-operative alternatives, expected recovery and outcomes were discussed with the patient. The possibilities of reaction to medication, pulmonary aspiration, " injury to surrounding structures, bleeding, recurrent infection, the need for additional procedures, failure to diagnose a condition, and creating a complication requiring transfusion or operation were discussed with the patient. The patient concurred with the proposed plan, giving informed consent.  The site of surgery was properly noted/marked if necessary per policy. The patient has been actively warmed in preoperative area. Preoperative antibiotics have been ordered and given within 1 hours of incision. Venous thrombosis prophylaxis have been ordered including bilateral sequential compression devices    Procedure Details: Preoperative checklist performed in the preoperative area confirming correct patient and correct procedure.  The patient was brought to the operating room.  Sequential compression devices were applied to bilateral lower extremities.  Following induction of general anesthesia, the patient was placed in lithotomy position using candy-cane stirrups.  The perineum/perianal region was prepped with betadine solution.  The patient was draped in the usual sterile fashion.  A time out was performed, once again confirming correct patient and correct procedure.      Following completion of perioperative antibiotics, a perianal exam was performed.  There was a moderatley sized posterior midline anal tag.  A well-lubricated digital rectal exam was performed and negative for palpable masses or blood.  A lubricated Hill-Begum retractor was inserted into the anorectal canal and 360 degree examination performed.  There were small non-bleeding internal hemorrhoids.  The posterior midline anal tag was grasped with Debakey forceps and retracted perpendicular to the anorectal canal.  The skin surrounding the tag was anesthetized with 1% lidocaine with epinephrine.  Using electrocautery, the tag was excised in its entirety.  There was good hemostasis of the wound bed.  The specimen was passed off the table for  final pathology.  Surgical count was performed and confirmed to be correct.  The wound was closed with multiple simple-interrupted 2-0 vicryl stitches.  Additional 1% lidocaine with epinephrine was injected circumferentially.  The perineal/perianal region was cleansed and dried.  A final count of instruments, needles, and sponges was performed and confirmed to be correct.  A sign out was performed.  The patient was awakened and taken to the recovery area in stable condition.    Complications:  None; patient tolerated the procedure well.    Disposition: PACU - hemodynamically stable.  Condition: stable     Additional Details: N/A    Attending Attestation: I was present and scrubbed for the entire procedure.    Qasim Lawson  Phone Number: [phone]

## 2024-03-11 NOTE — BRIEF OP NOTE
"Date: 3/11/2024  OR Location: [facility] OR    Name: Bradley Mendoza \"Holly\", : 1990, Age: 33 y.o., MRN: [id number], Sex: male    Diagnosis  Pre-op Diagnosis     * Anal skin tag [K64.4] Post-op Diagnosis     * Anal skin tag [K64.4]     Procedures  Excision of anal tag    Surgeons      * Qasim Lawson - Primary    Resident/Fellow/Other Assistant:  Surgeon(s) and Role:    Procedure Summary  Anesthesia: * No anesthesia type entered *  ASA: II  Anesthesia Staff: Anesthesiologist: Nathaniel Artis MD  C-AA: CHASE Golden  Estimated Blood Loss: 3mL  Intra-op Medications:   Administrations occurring from 0730 to 0910 on 24:   Medication Name Total Dose   lactated Ringer's infusion Cannot be calculated   metroNIDAZOLE (Flagyl) 500 mg in NaCl (iso-os) 100 mL 500 mg   ceFAZolin in dextrose (iso-os) (Ancef) IVPB 2 g 2 g              Anesthesia Record               Intraprocedure I/O Totals          Output    Est. Blood Loss 3 mL    Total Output 3 mL          Specimen:   ID Type Source Tests Collected by Time   1 : anal tag Tissue ANUS TAG SURGICAL PATHOLOGY EXAM Qasim Lawson MD 3/11/2024 0815        Staff:   Circulator: Fletcher Carlos RN  Scrub Person: Libra Thomas; Qasim Spear          Findings: Posterior midline anal tag.    Complications:  None; patient tolerated the procedure well.     Disposition: PACU - hemodynamically stable.  Condition: stable  Specimens Collected:   ID Type Source Tests Collected by Time   1 : anal tag Tissue ANUS TAG SURGICAL PATHOLOGY EXAM Qasim Lawson MD 3/11/2024 0815     Attending Attestation: I was present and scrubbed for the entire procedure.    Qasim Lawson  Phone Number: [phone]  "

## 2024-03-11 NOTE — ANESTHESIA PROCEDURE NOTES
Airway  Date/Time: 3/11/2024 7:52 AM  Urgency: elective    Airway not difficult    Staffing  Performed: CHASE   Authorized by: Nathaniel Artis MD    Performed by: CHASE Golden  Patient location during procedure: OR    Indications and Patient Condition  Indications for airway management: anesthesia  Spontaneous Ventilation: absent  Sedation level: deep  Preoxygenated: yes  Patient position: sniffing  MILS maintained throughout  Mask difficulty assessment: 1 - vent by mask    Final Airway Details  Final airway type: supraglottic airway      Successful airway: classic  Size 5  Airway Seal Pressure (cm H2O): 10     Number of attempts at approach: 1  Number of other approaches attempted: 0

## 2024-03-11 NOTE — ANESTHESIA POSTPROCEDURE EVALUATION
"Patient: Bradley Mendoza \"Holly\"    Procedure Summary       Date: 03/11/24 Room / Location: [facility] OR 04 / Virtual [facility] OR    Anesthesia Start: 0745 Anesthesia Stop: 0845    Procedure: Anal/Rectal Procedure/EUA Minor Diagnosis:       Anal skin tag      (Anal skin tag [K64.4])    Surgeons: Qasim Lawson MD Responsible Provider: Nathaniel Artis MD    Anesthesia Type: general ASA Status: 2            Anesthesia Type: general    Vitals Value Taken Time   /60 03/11/24 0842   Temp 36.1 03/11/24 0845   Pulse 78 03/11/24 0845   Resp 12 03/11/24 0845   SpO2 100 03/11/24 0845   Vitals shown include unvalidated device data.    Anesthesia Post Evaluation    Patient location during evaluation: PACU  Patient participation: complete - patient participated  Level of consciousness: awake and alert  Pain management: satisfactory to patient  Airway patency: patent  Cardiovascular status: acceptable and hemodynamically stable  Respiratory status: acceptable, spontaneous ventilation and room air  Hydration status: acceptable  Postoperative Nausea and Vomiting: none        No notable events documented.    "

## 2024-03-11 NOTE — DISCHARGE INSTRUCTIONS
WOUND CARE:  Recommend wearing panty-liner or applying gauze between buttocks to keep undergarments clean; change at least daily until no additional drainage noted.  Do not remove stitches.    PAIN CONTROL:  Can utilize prescribed percocet as needed.  Do not combine percocet with tylenol to avoid exceeding safe daily recommended intake of acetaminophen.  Can utilize ibuprofen.  OK to take sitz baths two-three times daily as needed.  Take stool softeners as needed.    ACTIVITY:  Avoid strenuous activity and heavy lifting for two weeks.  Do not drive or operative heavy machinery for at least first 24 hours after surgery; if you do not feel able to safely operate after first 24 hours or are taking narcotics (ie percocet or vicodin) then do not operative heavy machinery or drive.    DIET:  Resume previous diet.    FOLLOW-UP:   Please call office at [phone] to schedule follow-up appointment for approximately 3 weeks from date of surgery.

## 2024-03-11 NOTE — H&P
History Of Present Illness  Holly Mendoza is a 33 y.o. male presenting with posterior midline anal tag.  Presents for elective excision.  Last seen in office 1/12/2024, PMH/PSH/medications/allergies reviewed and no changes.     Past Medical History  Past Medical History:   Diagnosis Date    Gastritis     Other specified abnormal findings of blood chemistry 02/13/2020    Elevated LFTs       Surgical History  Past Surgical History:   Procedure Laterality Date    WISDOM TOOTH EXTRACTION      Bottom        Social History  He reports that he has never smoked. He has never used smokeless tobacco. He reports that he does not currently use alcohol. He reports that he does not currently use drugs.    Family History  Family History   Problem Relation Name Age of Onset    No Known Problems Mother      No Known Problems Father          Allergies  Patient has no known allergies.    Review of Systems   Constitutional:  Negative for activity change, appetite change, chills, fatigue, fever and unexpected weight change.   Respiratory:  Negative for cough, choking, chest tightness, shortness of breath and wheezing.    Cardiovascular:  Negative for chest pain, palpitations and leg swelling.   Gastrointestinal:  Negative for abdominal distention, abdominal pain, anal bleeding, blood in stool, constipation, diarrhea, nausea, rectal pain and vomiting.   Genitourinary:  Negative for difficulty urinating, dysuria, frequency and hematuria.   Neurological:  Negative for dizziness, weakness and light-headedness.   Psychiatric/Behavioral:  Negative for agitation.         Physical Exam  Constitutional:       General: He is not in acute distress.     Appearance: Normal appearance. He is not ill-appearing.   HENT:      Head: Normocephalic.      Mouth/Throat:      Mouth: Mucous membranes are moist.   Eyes:      Extraocular Movements: Extraocular movements intact.      Pupils: Pupils are equal, round, and reactive to light.   Cardiovascular:     "  Rate and Rhythm: Normal rate and regular rhythm.      Pulses: Normal pulses.      Heart sounds: Normal heart sounds. No murmur heard.  Pulmonary:      Effort: No respiratory distress.      Breath sounds: No wheezing, rhonchi or rales.   Chest:      Chest wall: No tenderness.   Abdominal:      General: There is no distension.      Palpations: There is no mass.      Tenderness: There is no abdominal tenderness. There is no guarding or rebound.      Hernia: No hernia is present.   Genitourinary:     Comments: Small posterior midline anal tag.  Musculoskeletal:         General: No swelling or deformity.      Cervical back: No rigidity.      Right lower leg: No edema.      Left lower leg: No edema.   Skin:     General: Skin is warm.      Coloration: Skin is not jaundiced or pale.   Neurological:      Mental Status: He is alert.          Last Recorded Vitals  Blood pressure 141/84, pulse 78, temperature 36.6 °C (97.9 °F), temperature source Temporal, resp. rate 18, height 1.727 m (5' 8\"), weight 97.5 kg (215 lb), SpO2 97 %.    Relevant Results  N/A     Assessment/Plan   Principal Problem:    Anal skin tag      Excision of anal tag in OR       I spent 15 minutes in the professional and overall care of this patient.      Qasim Lawson MD    "

## 2024-03-11 NOTE — ANESTHESIA PREPROCEDURE EVALUATION
"Patient: Bradley Mendoza \"Holly\"    Procedure Information       Date/Time: 03/11/24 0730    Procedure: Anal/Rectal Procedure/EUA Minor - Please change to EUA with excision of anal skin tag    Location: [facility] / [facility]    Surgeons: Qasim Lawson MD            Relevant Problems   Cardiovascular   (+) Hyperlipidemia      /Renal   (+) Fatty liver   (+) Steatohepatitis, nonalcoholic      Neuro/Psych   (+) Anxiety      GI/Hepatic   (+) Elevated LFTs   (+) Fatty liver   (+) Steatohepatitis, nonalcoholic       Clinical information reviewed:   Tobacco  Allergies  Meds   Med Hx  Surg Hx   Fam Hx  Soc Hx        NPO Detail:  No data recorded     Physical Exam    Airway  Mallampati: II  TM distance: >3 FB  Neck ROM: full     Cardiovascular   Rhythm: regular  Rate: normal     Dental - normal exam     Pulmonary - normal exam     Abdominal - normal exam             Anesthesia Plan    History of general anesthesia?: no  History of complications of general anesthesia?: no    ASA 2     general     intravenous induction   Anesthetic plan and risks discussed with patient.    Plan discussed with [facility]AA.      "

## 2024-03-19 LAB
LABORATORY COMMENT REPORT: NORMAL
PATH REPORT.FINAL DX SPEC: NORMAL
PATH REPORT.GROSS SPEC: NORMAL
PATH REPORT.RELEVANT HX SPEC: NORMAL
PATH REPORT.TOTAL CANCER: NORMAL

## 2024-04-03 ENCOUNTER — OFFICE VISIT (OUTPATIENT)
Dept: SURGERY | Facility: CLINIC | Age: 34
End: 2024-04-03
Payer: COMMERCIAL

## 2024-04-03 VITALS
TEMPERATURE: 98.1 F | WEIGHT: 214 LBS | HEIGHT: 68 IN | DIASTOLIC BLOOD PRESSURE: 75 MMHG | SYSTOLIC BLOOD PRESSURE: 116 MMHG | BODY MASS INDEX: 32.43 KG/M2 | HEART RATE: 60 BPM

## 2024-04-03 DIAGNOSIS — K64.4 ANAL SKIN TAG: Primary | ICD-10-CM

## 2024-04-03 PROCEDURE — 99024 POSTOP FOLLOW-UP VISIT: CPT | Performed by: NURSE PRACTITIONER

## 2024-04-03 NOTE — PROGRESS NOTES
Chief complaint:  POV    History of present illness:  Bradley is a 33M with here for anal skin tag.  He states he has an anal skin tag that has gotten larger over the last few years. He saw his PCP and referred her to discuss removal. Rare bleeding. Tag is tender but not painful. Intermittent swelling causing increased tenderness. Sometimes difficult to clean. He has BM 1-2 times daily. Occasional hard stool and straining but usually soft stools. Denies any anal incontinence.  + Anal sex. He has not been having anal sex the past few months.  Denies any STD history.    Interval history:  S/p anal skin tag removal with Dr. Lawson  FINAL DIAGNOSIS   ANUS, SKIN TAG, EXCISION:  - FIBROEPITHELIAL POLYP.        Colonoscopy: 2013 in [place]    Denies any F/C, N/V, CP/SOB, dysuria.   Appetite: good  Energy: good  Weight: stable  BM: no change in bowel habits.    Physical Exam:  Constitutional: Well developed, awake/alert/oriented x3, no distress, alert and cooperative   Eyes: Sclera anicteric, no conjunctival inflammation, conjugate gaze   ENMT: mucous membranes moist, no apparent injury,   Head/Neck: Neck supple, no apparent injury, trachea midline, no bruits   Respiratory/Thorax: Patent airways, CTAB, normal breath sounds with good chest expansion  Cardiovascular: Regular, rate and rhythm, no murmurs, normal S1 and S2   Gastrointestinal: Nondistended, soft, non-tender, no rebound tenderness or guarding, no masses palpable, no organomegaly, +BS, no bruits   Extremities: normal extremities, no edema  Neurological: alert and oriented x3, normal strength, Normal gait   Psychological: Appropriate mood and behavior   Skin: Warm and dry, no lesions, no rashes   Anorectal: Healed wound    Assessment:  33M tender anal skin tag  S/p excision of anal tag on 3/11/24  Doing well. Incision is healed    Plan:   -Continue adequate fluid intake: aim for 8-10, 8 oz glasses of water daily. Other beverages, such as juice, coffee, or tea may  count toward this goal.  -Continue high fiber foods: fruits , vegetables, whole grains, and beans. Aim for 25-35 grams per day.  -Continue powder fiber supplement daily. Like Metamucil, Citrucel, Konsyl or Benefiber daily-follow instructions on the bottle.  -Avoiding sitting on the toilet for prolonged periods of time  -Avoid straining  -Follow-up with Dr. Lawson or me PRN  -Colonoscopy at 47 unless any worrisome symptoms  -Discussed safe sex practices

## 2024-06-28 ENCOUNTER — OFFICE VISIT (OUTPATIENT)
Dept: PRIMARY CARE | Facility: CLINIC | Age: 34
End: 2024-06-28
Payer: COMMERCIAL

## 2024-06-28 VITALS
BODY MASS INDEX: 31.47 KG/M2 | DIASTOLIC BLOOD PRESSURE: 76 MMHG | TEMPERATURE: 97.4 F | WEIGHT: 207 LBS | SYSTOLIC BLOOD PRESSURE: 118 MMHG

## 2024-06-28 DIAGNOSIS — Z11.3 SCREENING EXAMINATION FOR STD (SEXUALLY TRANSMITTED DISEASE): Primary | ICD-10-CM

## 2024-06-28 PROCEDURE — 86803 HEPATITIS C AB TEST: CPT

## 2024-06-28 PROCEDURE — 86780 TREPONEMA PALLIDUM: CPT

## 2024-06-28 PROCEDURE — 1036F TOBACCO NON-USER: CPT | Performed by: FAMILY MEDICINE

## 2024-06-28 PROCEDURE — 87591 N.GONORRHOEAE DNA AMP PROB: CPT

## 2024-06-28 PROCEDURE — 87340 HEPATITIS B SURFACE AG IA: CPT

## 2024-06-28 PROCEDURE — 36415 COLL VENOUS BLD VENIPUNCTURE: CPT

## 2024-06-28 PROCEDURE — 87389 HIV-1 AG W/HIV-1&-2 AB AG IA: CPT

## 2024-06-28 PROCEDURE — 86696 HERPES SIMPLEX TYPE 2 TEST: CPT

## 2024-06-28 PROCEDURE — 87491 CHLMYD TRACH DNA AMP PROBE: CPT

## 2024-06-28 PROCEDURE — 86695 HERPES SIMPLEX TYPE 1 TEST: CPT

## 2024-06-28 PROCEDURE — 99213 OFFICE O/P EST LOW 20 MIN: CPT | Performed by: FAMILY MEDICINE

## 2024-06-28 NOTE — PROGRESS NOTES
"Subjective   Patient ID: [id number]     Bradley Mendoza \"Holly\" is a 33 y.o. male who presents for Labs Only (Requesting STD screening.).  HPI  He is requesting screening tests for STDs.     He was last tested in November.    No concern for exposure.      No symptoms.      No dysuria, testicle/scrotal pain, lesions or sores.      He has not had sex in a year.      When he is sexually active, it has been with men.  He is aware that PrEP is available.  Declines it for now.  Not active.         Objective     /76 (BP Location: Right arm, Patient Position: Sitting)   Temp 36.3 °C (97.4 °F) (Skin)   Wt 93.9 kg (207 lb)   BMI 31.47 kg/m²      Physical Exam  Constitutional:       Appearance: Normal appearance.   Neurological:      General: No focal deficit present.      Mental Status: He is alert.   Psychiatric:         Mood and Affect: Mood normal.         Assessment/Plan   Problem List Items Addressed This Visit    None  Visit Diagnoses       Screening examination for STD (sexually transmitted disease)    -  Primary    Relevant Orders    HSV1 IgG and HSV2 IgG    C. Trachomatis / N. Gonorrhoeae, Amplified Detection    Hepatitis B Surface Antigen    Hepatitis C Antibody    Syphilis Screen with Reflex    HIV 1/2 Antigen/Antibody Screen with Reflex to Confirmation          I will order labs to screen for STDs.  Return if you have any problems.    Moe Araujo, DO   "

## 2024-06-29 LAB
C TRACH RRNA SPEC QL NAA+PROBE: NEGATIVE
HBV SURFACE AG SERPL QL IA: NONREACTIVE
HCV AB SER QL: NONREACTIVE
HERPES SIMPLEX VIRUS 1 IGG: >8 INDEX
HERPES SIMPLEX VIRUS 2 IGG: <0.2 INDEX
HIV 1+2 AB+HIV1 P24 AG SERPL QL IA: NONREACTIVE
N GONORRHOEA DNA SPEC QL PROBE+SIG AMP: NEGATIVE
TREPONEMA PALLIDUM IGG+IGM AB [PRESENCE] IN SERUM OR PLASMA BY IMMUNOASSAY: NONREACTIVE

## 2024-11-05 ENCOUNTER — APPOINTMENT (OUTPATIENT)
Dept: PRIMARY CARE | Facility: CLINIC | Age: 34
End: 2024-11-05
Payer: COMMERCIAL

## 2024-11-05 VITALS
WEIGHT: 194 LBS | SYSTOLIC BLOOD PRESSURE: 118 MMHG | BODY MASS INDEX: 29.4 KG/M2 | DIASTOLIC BLOOD PRESSURE: 72 MMHG | HEIGHT: 68 IN | TEMPERATURE: 97.3 F

## 2024-11-05 DIAGNOSIS — Z23 NEED FOR VACCINATION: ICD-10-CM

## 2024-11-05 DIAGNOSIS — B00.2 ORAL HERPES: ICD-10-CM

## 2024-11-05 DIAGNOSIS — Z00.00 GENERAL MEDICAL EXAM: Primary | ICD-10-CM

## 2024-11-05 LAB
ALBUMIN SERPL BCP-MCNC: 4.6 G/DL (ref 3.4–5)
ALP SERPL-CCNC: 51 U/L (ref 33–120)
ALT SERPL W P-5'-P-CCNC: 18 U/L (ref 10–52)
ANION GAP SERPL CALC-SCNC: 10 MMOL/L (ref 10–20)
AST SERPL W P-5'-P-CCNC: 13 U/L (ref 9–39)
BASOPHILS # BLD AUTO: 0.07 X10*3/UL (ref 0–0.1)
BASOPHILS NFR BLD AUTO: 0.9 %
BILIRUB SERPL-MCNC: 0.7 MG/DL (ref 0–1.2)
BUN SERPL-MCNC: 13 MG/DL (ref 6–23)
CALCIUM SERPL-MCNC: 9.2 MG/DL (ref 8.6–10.3)
CHLORIDE SERPL-SCNC: 104 MMOL/L (ref 98–107)
CHOLEST SERPL-MCNC: 171 MG/DL (ref 0–199)
CHOLESTEROL/HDL RATIO: 4.8
CO2 SERPL-SCNC: 27 MMOL/L (ref 21–32)
CREAT SERPL-MCNC: 0.93 MG/DL (ref 0.5–1.3)
EGFRCR SERPLBLD CKD-EPI 2021: >90 ML/MIN/1.73M*2
EOSINOPHIL # BLD AUTO: 0.37 X10*3/UL (ref 0–0.7)
EOSINOPHIL NFR BLD AUTO: 5 %
ERYTHROCYTE [DISTWIDTH] IN BLOOD BY AUTOMATED COUNT: 13.2 % (ref 11.5–14.5)
EST. AVERAGE GLUCOSE BLD GHB EST-MCNC: 103 MG/DL
GLUCOSE SERPL-MCNC: 85 MG/DL (ref 74–99)
HBA1C MFR BLD: 5.2 %
HBV SURFACE AG SERPL QL IA: NONREACTIVE
HCT VFR BLD AUTO: 46.5 % (ref 41–52)
HCV AB SER QL: NONREACTIVE
HDLC SERPL-MCNC: 35.4 MG/DL
HERPES SIMPLEX VIRUS 1 IGG: >8 INDEX
HERPES SIMPLEX VIRUS 2 IGG: <0.2 INDEX
HGB BLD-MCNC: 15.9 G/DL (ref 13.5–17.5)
HIV 1+2 AB+HIV1 P24 AG SERPL QL IA: NONREACTIVE
IMM GRANULOCYTES # BLD AUTO: 0.01 X10*3/UL (ref 0–0.7)
IMM GRANULOCYTES NFR BLD AUTO: 0.1 % (ref 0–0.9)
LDLC SERPL CALC-MCNC: 113 MG/DL
LYMPHOCYTES # BLD AUTO: 1.46 X10*3/UL (ref 1.2–4.8)
LYMPHOCYTES NFR BLD AUTO: 19.6 %
MCH RBC QN AUTO: 29.2 PG (ref 26–34)
MCHC RBC AUTO-ENTMCNC: 34.2 G/DL (ref 32–36)
MCV RBC AUTO: 86 FL (ref 80–100)
MONOCYTES # BLD AUTO: 0.59 X10*3/UL (ref 0.1–1)
MONOCYTES NFR BLD AUTO: 7.9 %
NEUTROPHILS # BLD AUTO: 4.95 X10*3/UL (ref 1.2–7.7)
NEUTROPHILS NFR BLD AUTO: 66.5 %
NON HDL CHOLESTEROL: 136 MG/DL (ref 0–149)
NRBC BLD-RTO: 0 /100 WBCS (ref 0–0)
PLATELET # BLD AUTO: 324 X10*3/UL (ref 150–450)
POTASSIUM SERPL-SCNC: 4.1 MMOL/L (ref 3.5–5.3)
PROT SERPL-MCNC: 7.2 G/DL (ref 6.4–8.2)
RBC # BLD AUTO: 5.44 X10*6/UL (ref 4.5–5.9)
SODIUM SERPL-SCNC: 137 MMOL/L (ref 136–145)
TREPONEMA PALLIDUM IGG+IGM AB [PRESENCE] IN SERUM OR PLASMA BY IMMUNOASSAY: NONREACTIVE
TRIGL SERPL-MCNC: 115 MG/DL (ref 0–149)
TSH SERPL-ACNC: 1.06 MIU/L (ref 0.44–3.98)
VLDL: 23 MG/DL (ref 0–40)
WBC # BLD AUTO: 7.5 X10*3/UL (ref 4.4–11.3)

## 2024-11-05 PROCEDURE — 87340 HEPATITIS B SURFACE AG IA: CPT

## 2024-11-05 PROCEDURE — 3008F BODY MASS INDEX DOCD: CPT | Performed by: FAMILY MEDICINE

## 2024-11-05 PROCEDURE — 83036 HEMOGLOBIN GLYCOSYLATED A1C: CPT

## 2024-11-05 PROCEDURE — 87491 CHLMYD TRACH DNA AMP PROBE: CPT

## 2024-11-05 PROCEDURE — 1036F TOBACCO NON-USER: CPT | Performed by: FAMILY MEDICINE

## 2024-11-05 PROCEDURE — 85025 COMPLETE CBC W/AUTO DIFF WBC: CPT

## 2024-11-05 PROCEDURE — 90471 IMMUNIZATION ADMIN: CPT | Performed by: FAMILY MEDICINE

## 2024-11-05 PROCEDURE — 86695 HERPES SIMPLEX TYPE 1 TEST: CPT

## 2024-11-05 PROCEDURE — 87591 N.GONORRHOEAE DNA AMP PROB: CPT

## 2024-11-05 PROCEDURE — 86696 HERPES SIMPLEX TYPE 2 TEST: CPT

## 2024-11-05 PROCEDURE — 80053 COMPREHEN METABOLIC PANEL: CPT

## 2024-11-05 PROCEDURE — 99395 PREV VISIT EST AGE 18-39: CPT | Performed by: FAMILY MEDICINE

## 2024-11-05 PROCEDURE — 80061 LIPID PANEL: CPT

## 2024-11-05 PROCEDURE — 87389 HIV-1 AG W/HIV-1&-2 AB AG IA: CPT

## 2024-11-05 PROCEDURE — 84443 ASSAY THYROID STIM HORMONE: CPT

## 2024-11-05 PROCEDURE — 86803 HEPATITIS C AB TEST: CPT

## 2024-11-05 PROCEDURE — 86780 TREPONEMA PALLIDUM: CPT

## 2024-11-05 PROCEDURE — 90656 IIV3 VACC NO PRSV 0.5 ML IM: CPT | Performed by: FAMILY MEDICINE

## 2024-11-05 RX ORDER — VALACYCLOVIR HYDROCHLORIDE 1 G/1
2000 TABLET, FILM COATED ORAL 2 TIMES DAILY
Qty: 4 TABLET | Refills: 5 | Status: SHIPPED | OUTPATIENT
Start: 2024-11-05 | End: 2024-11-11

## 2024-11-05 ASSESSMENT — ENCOUNTER SYMPTOMS
HEADACHES: 0
VOICE CHANGE: 0
DIZZINESS: 0
SORE THROAT: 1
PALPITATIONS: 0
SHORTNESS OF BREATH: 0
WHEEZING: 0
DYSURIA: 0
BACK PAIN: 0
ADENOPATHY: 0
SLEEP DISTURBANCE: 0
DYSPHORIC MOOD: 0
MYALGIAS: 0
NERVOUS/ANXIOUS: 1
SINUS PAIN: 0
ABDOMINAL PAIN: 0
CONSTIPATION: 0
NAUSEA: 0
WOUND: 0
FATIGUE: 0
NUMBNESS: 0
BLOOD IN STOOL: 0
FEVER: 0
WEAKNESS: 0
FREQUENCY: 0
DIARRHEA: 0
HEMATURIA: 0
RHINORRHEA: 0
VOMITING: 0
ARTHRALGIAS: 1
COUGH: 0
TROUBLE SWALLOWING: 0

## 2024-11-05 ASSESSMENT — PATIENT HEALTH QUESTIONNAIRE - PHQ9
SUM OF ALL RESPONSES TO PHQ9 QUESTIONS 1 AND 2: 0
2. FEELING DOWN, DEPRESSED OR HOPELESS: NOT AT ALL
1. LITTLE INTEREST OR PLEASURE IN DOING THINGS: NOT AT ALL

## 2024-11-05 NOTE — PATIENT INSTRUCTIONS
Continue with weight loss.  Keep up the good work.  Labs were ordered today.  A flu shot was given.  Return in one year for a general medical exam.

## 2024-11-05 NOTE — PROGRESS NOTES
"Subjective   Patient ID: [id number]     Bradley Mendoza \"Holly\" is a 33 y.o. male who presents for Annual Exam (Fasting) and Flu Vaccine.  HPI  He is here for a general medical exam.     He feels well in general.      No hospitalizations over the last year.  Had surgery for a skin tag removal.      Meds include Metamucil.    Current Outpatient Medications on File Prior to Visit   Medication Sig Dispense Refill    psyllium (Metamucil) 3.4 gram packet Take 1 packet by mouth once daily.       No current facility-administered medications on file prior to visit.     Tobacco Use: Low Risk  (11/5/2024)    Patient History     Smoking Tobacco Use: Never     Smokeless Tobacco Use: Never     Passive Exposure: Not on file   Very rare alcohol.  No drugs.    Tries with exercise.  Sometimes none at all and sometimes 4 or 5 times per week.    He maintains a healthy diet. He has been trying to lose weight.  He has lost 20 pounds from April.    Family History   Problem Relation Name Age of Onset    No Known Problems Mother      No Known Problems Father     Dad and grandparents with diabetes.  Dad has depression and HTN.  Grandfather had prostate cancer in his fifties or sixties.  Mom has bipolar, HTN.  Sibs healthy.  Review of Systems   Constitutional:  Negative for fatigue and fever.   HENT:  Positive for sore throat (for a month.  \"i think it is weather\".). Negative for congestion, rhinorrhea, sinus pain, trouble swallowing and voice change.    Respiratory:  Negative for cough, shortness of breath and wheezing.    Cardiovascular:  Negative for chest pain, palpitations and leg swelling.   Gastrointestinal:  Negative for abdominal pain, blood in stool, constipation, diarrhea, nausea and vomiting.   Genitourinary:  Negative for dysuria, frequency (no nocturia.), hematuria, scrotal swelling and testicular pain.   Musculoskeletal:  Positive for arthralgias (left shoulder pain for the last year.  worse in the morning waking up.). " "Negative for back pain and myalgias.   Skin:  Negative for rash and wound.   Neurological:  Negative for dizziness, syncope, weakness, numbness and headaches.   Hematological:  Negative for adenopathy.   Psychiatric/Behavioral:  Negative for dysphoric mood, self-injury, sleep disturbance and suicidal ideas. The patient is nervous/anxious.        Objective     /72 (BP Location: Left arm, Patient Position: Sitting)   Temp 36.3 °C (97.3 °F) (Skin)   Ht 1.715 m (5' 7.5\")   Wt 88 kg (194 lb)   BMI 29.94 kg/m²      Physical Exam  Vitals reviewed.   Constitutional:       General: He is not in acute distress.     Appearance: Normal appearance. He is not ill-appearing or toxic-appearing.   HENT:      Head: Normocephalic and atraumatic.      Right Ear: Tympanic membrane, ear canal and external ear normal.      Left Ear: Tympanic membrane, ear canal and external ear normal.      Nose: Nose normal.      Mouth/Throat:      Mouth: Mucous membranes are moist.      Comments: Cold sore on lip. Has been there for a week.  Eyes:      Extraocular Movements: Extraocular movements intact.      Conjunctiva/sclera: Conjunctivae normal.      Pupils: Pupils are equal, round, and reactive to light.   Cardiovascular:      Rate and Rhythm: Normal rate and regular rhythm.      Heart sounds: No murmur heard.  Pulmonary:      Effort: Pulmonary effort is normal.      Breath sounds: Normal breath sounds.   Abdominal:      General: Bowel sounds are normal. There is no distension.      Palpations: Abdomen is soft. There is no mass.      Tenderness: There is no abdominal tenderness. There is no guarding or rebound.      Hernia: No hernia is present.   Genitourinary:     Penis: Normal.       Testes: Normal.   Musculoskeletal:         General: No tenderness.      Cervical back: Neck supple.      Right lower leg: No edema.      Left lower leg: No edema.   Skin:     Coloration: Skin is not jaundiced or pale.      Findings: No rash. "   Neurological:      General: No focal deficit present.      Mental Status: He is alert and oriented to person, place, and time. Mental status is at baseline.   Psychiatric:         Mood and Affect: Mood normal.         Behavior: Behavior normal.         Thought Content: Thought content normal.         Judgment: Judgment normal.         Assessment/Plan   Problem List Items Addressed This Visit    None  Visit Diagnoses       General medical exam    -  Primary    Relevant Orders    CBC and Auto Differential    Hemoglobin A1C    Comprehensive Metabolic Panel    Lipid Panel    Thyroid Stimulating Hormone    HSV1 IgG and HSV2 IgG    C. trachomatis / N. gonorrhoeae, Amplified    Hepatitis B Surface Antigen    Hepatitis C Antibody    Syphilis Screen with Reflex    HIV 1/2 Antigen/Antibody Screen with Reflex to Confirmation    Need for vaccination        Relevant Orders    Flu vaccine, trivalent, preservative free, age 6 months and greater (Fluarix/Fluzone/Flulaval)    Oral herpes        Relevant Medications    valACYclovir (Valtrex) 1 gram tablet        Continue with weight loss.  Keep up the good work.  Labs were ordered today.  A flu shot was given.  Return in one year for a general medical exam.      Moe Araujo, DO

## 2024-11-06 LAB
C TRACH RRNA SPEC QL NAA+PROBE: NEGATIVE
N GONORRHOEA DNA SPEC QL PROBE+SIG AMP: NEGATIVE

## (undated) DEVICE — SUTURE, ETHIBOND EXCEL 1, TAPER POINT CT-1 GREEN 30 INCH

## (undated) DEVICE — GLOVE, SURGICAL, PROTEXIS PI , 7.5, PF, LF

## (undated) DEVICE — CATHETER, IV, INSYTE, AUTOGUARD, SHIELDED, 20 G X 1.16 IN, VIALON

## (undated) DEVICE — SUTURE, VICRYL, 2-0, 27 IN, SH, UNDYED

## (undated) DEVICE — GLOVE, SURGICAL, BIOGEL, 7.5, PF, LATEX, GREEN

## (undated) DEVICE — VESSEL LOOP, RED MAXI, 2 CARD

## (undated) DEVICE — SOLUTION, IRRIGATION, STERILE WATER, 1000 ML, POUR BOTTLE

## (undated) DEVICE — Device

## (undated) DEVICE — SOLUTION, IRRIGATION, SODIUM CHLORIDE 0.9%, 1000 ML, POUR BOTTLE